# Patient Record
(demographics unavailable — no encounter records)

---

## 2025-02-06 NOTE — PLAN
[FreeTextEntry1] : Patient notes never sexually active, no internal exam. Examined with Grandmother present in the room. On exam of bikini line at area of pain not s/s of folliculitis noted. No discrete lump, erythema or induration. Patients' area of concern felt to be ligament on exam, moves with movement of leg and showed patient the same "bump" is present on her left side as well.  Given this is felt to be more musculoskeletal in nature recommend appointment with ortho. Can trial motrin 400mg every 6-8hrs as needed for pain. Can also trial hot pack or cold pack, discussed would not place either hot or cold pack directly on skin.  Contacts for peds ortho provided.

## 2025-02-06 NOTE — PHYSICAL EXAM
[Appropriately responsive] : appropriately responsive [Alert] : alert [No Acute Distress] : no acute distress [Oriented x3] : oriented x3 Patient/Family

## 2025-02-06 NOTE — HISTORY OF PRESENT ILLNESS
[N] : Patient denies prior pregnancies [TextBox_4] : 16 year old female presents for as new patient with her grandmother. Here for possible lump/bump in patients right groin area. Grandmother notes patient was seen by per pediatrician and prescribed antibiotics for an infection, unsure what type of infection or antibiotic name however notes patient did not take the antibiotic and they are here today for a second opinion. Patient states initially she had a bump in the area but it is now gone, notes ongoing groin pain. Patient unsure how long pain has been present of if she was doing activity when she first noted pain. After speaking with patient's mother Rosanne over the phone she notes pain has been present for months. Patient never sexually active, does shave at bikini line. [LMPDate] : 1/15/24